# Patient Record
Sex: MALE | Race: WHITE | NOT HISPANIC OR LATINO
[De-identification: names, ages, dates, MRNs, and addresses within clinical notes are randomized per-mention and may not be internally consistent; named-entity substitution may affect disease eponyms.]

---

## 2020-11-12 ENCOUNTER — TRANSCRIPTION ENCOUNTER (OUTPATIENT)
Age: 70
End: 2020-11-12

## 2020-11-19 ENCOUNTER — APPOINTMENT (OUTPATIENT)
Dept: PULMONOLOGY | Facility: HOSPITAL | Age: 70
End: 2020-11-19
Payer: MEDICARE

## 2020-11-19 VITALS — BODY MASS INDEX: 33.18 KG/M2 | WEIGHT: 245 LBS | HEIGHT: 72 IN

## 2020-11-19 DIAGNOSIS — Z87.891 PERSONAL HISTORY OF NICOTINE DEPENDENCE: ICD-10-CM

## 2020-11-19 DIAGNOSIS — N40.0 BENIGN PROSTATIC HYPERPLASIA WITHOUT LOWER URINARY TRACT SYMPMS: ICD-10-CM

## 2020-11-19 DIAGNOSIS — F41.9 ANXIETY DISORDER, UNSPECIFIED: ICD-10-CM

## 2020-11-19 DIAGNOSIS — E78.5 HYPERLIPIDEMIA, UNSPECIFIED: ICD-10-CM

## 2020-11-19 PROBLEM — Z00.00 ENCOUNTER FOR PREVENTIVE HEALTH EXAMINATION: Status: ACTIVE | Noted: 2020-11-19

## 2020-11-19 PROCEDURE — 99213 OFFICE O/P EST LOW 20 MIN: CPT | Mod: 95

## 2020-11-19 RX ORDER — ROSUVASTATIN CALCIUM 5 MG/1
TABLET, FILM COATED ORAL
Refills: 0 | Status: ACTIVE | COMMUNITY

## 2020-11-19 RX ORDER — DUTASTERIDE 0.5 MG/1
CAPSULE, LIQUID FILLED ORAL
Refills: 0 | Status: ACTIVE | COMMUNITY

## 2020-11-19 NOTE — HISTORY OF PRESENT ILLNESS
[FreeTextEntry1] : pmd: Dr. Montoya\par \par History of present illness:\par 70 -year-old man with a history of  BPH, dyslipidemia, GERD is referred to the sleep Center for management of sleep apnea.  Patients symptoms improved with cpap - snoring improved, anxiety episodes improved.  Dme - apria\par uses fullface mask\par average usage 7hr and 50 min\par ahi 0.7\par average pressure 12 cm ( 10-14)

## 2020-11-19 NOTE — REASON FOR VISIT
[Home] : at home, [unfilled] , at the time of the visit. [Medical Office: ([facility])___] : at the medical office located in  [Verbal consent obtained from patient] : the patient, [unfilled] [Follow-Up] : a follow-up visit [FreeTextEntry1] : sleep apnea

## 2020-11-19 NOTE — ASSESSMENT
[FreeTextEntry1] : 70 year  old man  with sleep apnea is doing well with the CPAP.  Patient is compliant with the CPAP and benefited significantly with the CPAP.\par

## 2020-12-22 ENCOUNTER — APPOINTMENT (OUTPATIENT)
Dept: HEART AND VASCULAR | Facility: CLINIC | Age: 70
End: 2020-12-22

## 2021-11-16 ENCOUNTER — APPOINTMENT (OUTPATIENT)
Dept: PULMONOLOGY | Facility: CLINIC | Age: 71
End: 2021-11-16
Payer: MEDICARE

## 2021-11-16 VITALS — WEIGHT: 245 LBS | BODY MASS INDEX: 33.18 KG/M2 | HEIGHT: 72 IN

## 2021-11-16 PROCEDURE — 99212 OFFICE O/P EST SF 10 MIN: CPT | Mod: 95

## 2021-11-16 RX ORDER — FAMOTIDINE 20 MG/1
20 TABLET, FILM COATED ORAL
Refills: 0 | Status: DISCONTINUED | COMMUNITY
End: 2021-11-16

## 2021-11-16 RX ORDER — TAMSULOSIN HYDROCHLORIDE 0.4 MG/1
0.4 CAPSULE ORAL
Refills: 0 | Status: ACTIVE | COMMUNITY

## 2021-11-16 NOTE — HISTORY OF PRESENT ILLNESS
[FreeTextEntry1] : pmd: Dr. Montoya\par \par History of present illness:\par 71 -year-old man with a history of  BPH, dyslipidemia, GERD is referred to the sleep Center for management of sleep apnea.  Patients symptoms improved with cpap - snoring improved, anxiety episodes improved.  Dme - apria\par uses fullface mask\par average usage 7hr and 50 min\par ahi 0.4\par average pressure 12 cm ( 10-14)\par dme apria

## 2021-11-16 NOTE — ASSESSMENT
[FreeTextEntry1] : 71 year  old man  with sleep apnea is doing well with the CPAP.  Patient is compliant with the CPAP and benefited significantly with the CPAP.\par

## 2021-11-16 NOTE — REASON FOR VISIT
[Home] : at home, [unfilled] , at the time of the visit. [Medical Office: ([facility])___] : at the medical office located in  [Verbal consent obtained from patient] : the patient, [unfilled] [Follow-Up] : a follow-up visit [Sleep Apnea] : sleep apnea

## 2022-11-08 ENCOUNTER — OFFICE (OUTPATIENT)
Dept: URBAN - METROPOLITAN AREA CLINIC 122 | Facility: CLINIC | Age: 72
Setting detail: OPHTHALMOLOGY
End: 2022-11-08
Payer: MEDICARE

## 2022-11-08 DIAGNOSIS — H11.153: ICD-10-CM

## 2022-11-08 DIAGNOSIS — H02.831: ICD-10-CM

## 2022-11-08 DIAGNOSIS — H02.834: ICD-10-CM

## 2022-11-08 DIAGNOSIS — H16.223: ICD-10-CM

## 2022-11-08 DIAGNOSIS — H52.7: ICD-10-CM

## 2022-11-08 DIAGNOSIS — H25.13: ICD-10-CM

## 2022-11-08 DIAGNOSIS — H52.4: ICD-10-CM

## 2022-11-08 DIAGNOSIS — H43.393: ICD-10-CM

## 2022-11-08 PROCEDURE — 92015 DETERMINE REFRACTIVE STATE: CPT | Performed by: OPHTHALMOLOGY

## 2022-11-08 PROCEDURE — 92014 COMPRE OPH EXAM EST PT 1/>: CPT | Performed by: OPHTHALMOLOGY

## 2022-11-08 ASSESSMENT — REFRACTION_CURRENTRX
OD_AXIS: 35
OD_AXIS: 55
OS_SPHERE: +1.75
OS_SPHERE: +3.50
OS_SPHERE: +1.50
OD_CYLINDER: -2.00
OD_SPHERE: +2.25
OD_SPHERE: +4.75
OD_AXIS: 30
OD_OVR_VA: 20/
OS_SPHERE: +3.75
OD_CYLINDER: -2.00
OS_OVR_VA: 20/
OD_SPHERE: +2.00
OD_OVR_VA: 20/
OD_SPHERE: +4.25
OS_CYLINDER: SPHERE
OS_CYLINDER: SPHERE
OD_AXIS: 53
OD_SPHERE: +3.50
OS_CYLINDER: -1.00
OS_OVR_VA: 20/
OD_CYLINDER: -1.00
OD_CYLINDER: -1.25
OS_SPHERE: +3.25
OS_CYLINDER: -1.00
OD_SPHERE: +3.25
OS_OVR_VA: 20/
OS_AXIS: 139
OS_AXIS: 133
OD_CYLINDER: -1.25
OS_CYLINDER: -1.00
OS_AXIS: 137
OD_AXIS: 30
OS_AXIS: 180
OS_SPHERE: +1.00
OD_SPHERE: +2.50
OS_AXIS: 180
OD_AXIS: 25
OS_SPHERE: +3.25
OS_CYLINDER: -1.00
OD_CYLINDER: -1.00
OS_AXIS: 137
OD_OVR_VA: 20/

## 2022-11-08 ASSESSMENT — SPHEQUIV_DERIVED
OS_SPHEQUIV: 1
OD_SPHEQUIV: 1.875
OD_SPHEQUIV: 1.875
OD_SPHEQUIV: 1.5
OS_SPHEQUIV: 1.25
OD_SPHEQUIV: 1.5

## 2022-11-08 ASSESSMENT — REFRACTION_MANIFEST
OD_SPHERE: +2.00
OD_CYLINDER: -1.00
OS_CYLINDER: SPHERE
OD_SPHERE: +2.50
OS_ADD: +2.50
OD_VA2: 20
OD_ADD: +2.25
OD_SPHERE: +2.50
OS_SPHERE: +1.25
OS_CYLINDER: SPH
OS_SPHERE: +3.50
OS_SPHERE: +1.50
OD_CYLINDER: -2.00
OD_AXIS: 30
OD_ADD: +2.50
OS_ADD: +2.25
OS_AXIS: 140
OD_VA1: 20/25
OS_VA1: 20/20
OD_CYLINDER: SPHERE
OD_AXIS: 30
OD_SPHERE: +4.50
OD_CYLINDER: -1.25
OS_VA1: 20/25
OS_VA1: 20/20
OS_CYLINDER: -1.00
OS_CYLINDER: SPHERE
OS_SPHERE: +1.50
OD_VA1: 20/25
OD_AXIS: 40
OD_VA1: 20/20

## 2022-11-08 ASSESSMENT — KERATOMETRY
OD_K1POWER_DIOPTERS: 44.75
OS_K2POWER_DIOPTERS: 45.50
OS_K1POWER_DIOPTERS: 45.00
OD_K2POWER_DIOPTERS: 45.50
OS_AXISANGLE_DEGREES: 66
OD_AXISANGLE_DEGREES: 150
METHOD_AUTO_MANUAL: AUTO

## 2022-11-08 ASSESSMENT — LID POSITION - DERMATOCHALASIS
OS_DERMATOCHALASIS: LUL 2+
OD_DERMATOCHALASIS: RUL 2+

## 2022-11-08 ASSESSMENT — AXIALLENGTH_DERIVED
OD_AL: 22.4669
OD_AL: 22.3352
OD_AL: 22.3352
OS_AL: 22.5137
OD_AL: 22.4669
OS_AL: 22.6028

## 2022-11-08 ASSESSMENT — VISUAL ACUITY
OD_BCVA: 20/25
OS_BCVA: 20/30

## 2022-11-08 ASSESSMENT — REFRACTION_AUTOREFRACTION
OD_CYLINDER: -0.75
OS_SPHERE: +1.75
OD_SPHERE: +2.25
OD_AXIS: 47
OS_AXIS: 142
OS_CYLINDER: -1.00

## 2022-11-08 ASSESSMENT — CONFRONTATIONAL VISUAL FIELD TEST (CVF)
OD_FINDINGS: FULL
OS_FINDINGS: FULL

## 2022-11-08 ASSESSMENT — TONOMETRY
OS_IOP_MMHG: 12
OD_IOP_MMHG: 12

## 2022-11-08 ASSESSMENT — SUPERFICIAL PUNCTATE KERATITIS (SPK)
OS_SPK: 2+
OD_SPK: 2+

## 2022-12-28 ENCOUNTER — APPOINTMENT (OUTPATIENT)
Dept: PULMONOLOGY | Facility: CLINIC | Age: 72
End: 2022-12-28
Payer: MEDICARE

## 2022-12-28 VITALS — BODY MASS INDEX: 32.51 KG/M2 | WEIGHT: 240 LBS | HEIGHT: 72 IN

## 2022-12-28 PROCEDURE — 99213 OFFICE O/P EST LOW 20 MIN: CPT | Mod: 95

## 2022-12-28 NOTE — REASON FOR VISIT
[Follow-Up] : a follow-up visit [Sleep Apnea] : sleep apnea [Home] : at home, [unfilled] , at the time of the visit. [Medical Office: ([facility])___] : at the medical office located in  [Verbal consent obtained from patient] : the patient, [unfilled]

## 2022-12-28 NOTE — HISTORY OF PRESENT ILLNESS
[FreeTextEntry1] : pmd: Dr. Montoya\par \par History of present illness:\par 72 -year-old man with a history of  BPH, dyslipidemia, GERD, covid (mild disease 2022) is referred to the sleep Center for management of sleep apnea.  Patients symptoms improved with cpap - snoring improved, anxiety episodes improved.  Dme - apria\par uses fullface mask\par average usage 7hr and 20 min\par ahi 0.4\par average pressure 12 cm ( 10-14)\par on resmed machine

## 2023-01-12 ENCOUNTER — TRANSCRIPTION ENCOUNTER (OUTPATIENT)
Age: 73
End: 2023-01-12

## 2023-11-14 ENCOUNTER — OFFICE (OUTPATIENT)
Dept: URBAN - METROPOLITAN AREA CLINIC 122 | Facility: CLINIC | Age: 73
Setting detail: OPHTHALMOLOGY
End: 2023-11-14
Payer: MEDICARE

## 2023-11-14 DIAGNOSIS — H16.223: ICD-10-CM

## 2023-11-14 DIAGNOSIS — H25.13: ICD-10-CM

## 2023-11-14 DIAGNOSIS — H02.834: ICD-10-CM

## 2023-11-14 DIAGNOSIS — H43.393: ICD-10-CM

## 2023-11-14 DIAGNOSIS — H02.831: ICD-10-CM

## 2023-11-14 PROBLEM — D18.01 HEMANGIOMA: Status: ACTIVE | Noted: 2023-11-14

## 2023-11-14 PROCEDURE — 92014 COMPRE OPH EXAM EST PT 1/>: CPT | Mod: 25 | Performed by: OPHTHALMOLOGY

## 2023-11-14 PROCEDURE — 68761 CLOSE TEAR DUCT OPENING: CPT | Mod: 50 | Performed by: OPHTHALMOLOGY

## 2023-11-14 ASSESSMENT — REFRACTION_MANIFEST
OS_ADD: +2.50
OD_VA1: 20/25
OD_AXIS: 30
OD_SPHERE: +2.50
OS_SPHERE: +1.50
OD_SPHERE: +2.50
OS_CYLINDER: SPH
OD_VA2: 20
OS_CYLINDER: SPHERE
OS_VA1: 20/20
OD_VA1: 20/20
OS_SPHERE: +1.50
OD_VA1: 20/25
OD_ADD: +2.25
OS_ADD: +2.25
OD_SPHERE: +2.00
OD_CYLINDER: -1.00
OD_SPHERE: +4.50
OS_CYLINDER: -1.00
OS_SPHERE: +1.25
OD_CYLINDER: SPHERE
OS_VA1: 20/25
OD_CYLINDER: -2.00
OS_VA1: 20/20
OD_ADD: +2.50
OD_AXIS: 30
OD_CYLINDER: -1.25
OS_AXIS: 140
OS_SPHERE: +3.50
OD_AXIS: 40
OS_CYLINDER: SPHERE

## 2023-11-14 ASSESSMENT — LID POSITION - DERMATOCHALASIS
OS_DERMATOCHALASIS: LUL 2+
OD_DERMATOCHALASIS: RUL 2+

## 2023-11-14 ASSESSMENT — REFRACTION_AUTOREFRACTION
OD_AXIS: 47
OS_CYLINDER: -1.00
OD_SPHERE: +2.25
OS_SPHERE: +1.75
OS_AXIS: 142
OD_CYLINDER: -0.75

## 2023-11-14 ASSESSMENT — SPHEQUIV_DERIVED
OD_SPHEQUIV: 1.5
OD_SPHEQUIV: 1.875
OD_SPHEQUIV: 1.5
OS_SPHEQUIV: 1
OS_SPHEQUIV: 1.25
OD_SPHEQUIV: 1.875

## 2023-11-14 ASSESSMENT — REFRACTION_CURRENTRX
OS_SPHERE: +1.75
OD_OVR_VA: 20/
OD_AXIS: 25
OD_AXIS: 33
OS_CYLINDER: -1.00
OS_CYLINDER: SPHERE
OS_CYLINDER: -1.00
OD_SPHERE: +2.25
OD_AXIS: 55
OD_SPHERE: +2.00
OD_CYLINDER: -2.00
OD_CYLINDER: -1.00
OS_SPHERE: +3.75
OD_SPHERE: +4.25
OD_SPHERE: +3.50
OD_AXIS: 30
OS_AXIS: 133
OS_CYLINDER: SPHERE
OS_AXIS: 139
OS_OVR_VA: 20/
OD_SPHERE: +2.00
OS_SPHERE: +1.00
OD_CYLINDER: -1.25
OS_SPHERE: +3.25
OS_SPHERE: +3.25
OD_CYLINDER: -2.00
OD_AXIS: 30
OD_OVR_VA: 20/
OD_OVR_VA: 20/
OS_CYLINDER: -1.00
OD_SPHERE: +3.25
OD_SPHERE: +2.50
OD_SPHERE: +4.75
OD_CYLINDER: -1.00
OS_OVR_VA: 20/
OS_AXIS: 137
OD_CYLINDER: -1.25
OS_SPHERE: +1.25
OS_AXIS: 137
OD_AXIS: 35
OS_CYLINDER: -1.00
OS_OVR_VA: 20/
OD_AXIS: 53
OS_SPHERE: +3.50
OS_AXIS: 180
OD_CYLINDER: -1.00
OS_SPHERE: +1.50
OS_AXIS: 180

## 2023-11-14 ASSESSMENT — CONFRONTATIONAL VISUAL FIELD TEST (CVF)
OS_FINDINGS: FULL
OD_FINDINGS: FULL

## 2023-11-14 ASSESSMENT — SUPERFICIAL PUNCTATE KERATITIS (SPK)
OD_SPK: 2+
OS_SPK: 2+

## 2024-01-12 ENCOUNTER — APPOINTMENT (OUTPATIENT)
Dept: PULMONOLOGY | Facility: CLINIC | Age: 74
End: 2024-01-12
Payer: MEDICARE

## 2024-01-12 VITALS — BODY MASS INDEX: 33.6 KG/M2 | HEIGHT: 71 IN | WEIGHT: 240 LBS

## 2024-01-12 DIAGNOSIS — G47.33 OBSTRUCTIVE SLEEP APNEA (ADULT) (PEDIATRIC): ICD-10-CM

## 2024-01-12 PROCEDURE — 99212 OFFICE O/P EST SF 10 MIN: CPT

## 2024-01-12 NOTE — HISTORY OF PRESENT ILLNESS
[% Days used: ____] : Days used: [unfilled] % [% Days used > 4 hrs: ____] : Days used > 4 hrs: [unfilled] % [Mean nightly usage: ___ hrs] : Mean nightly usage: [unfilled] hrs [Therapy based AHI: ___ /hr] : Therapy based AHI: [unfilled] / hr [FreeTextEntry1] : pmd: Dr. Montoya  History of present illness: 73 -year-old man with a history of  BPH, dyslipidemia, GERD, covid (mild disease 2022) is referred to the sleep Center for management of sleep apnea.  Patients symptoms improved with cpap - snoring improved, anxiety episodes improved.  Dme - apria uses fullface mask average usage 7hr and 20 min ahi 0.5 average pressure 12 cm ( 10-14) on resmed machine

## 2024-01-12 NOTE — REASON FOR VISIT
[Follow-Up] : a follow-up visit [Sleep Apnea] : sleep apnea [FreeTextEntry2] : sleep apnea [TextEntry] :  This visit is done virtually as per patients request. Patient understood limitations of tele visit and agrees to move forward. patients location - [place], [place] doctors location - [place], [place] identification verified with patients name and date of birth.

## 2024-01-12 NOTE — ASSESSMENT
[FreeTextEntry1] : 73 year  old man  with sleep apnea is doing well with the CPAP.  Patient is compliant with the CPAP and benefited significantly with the CPAP.

## 2024-04-23 ENCOUNTER — APPOINTMENT (OUTPATIENT)
Dept: SURGERY | Facility: CLINIC | Age: 74
End: 2024-04-23
Payer: MEDICARE

## 2024-04-23 VITALS — TEMPERATURE: 98.3 F | SYSTOLIC BLOOD PRESSURE: 136 MMHG | HEART RATE: 67 BPM | DIASTOLIC BLOOD PRESSURE: 77 MMHG

## 2024-04-23 DIAGNOSIS — Z87.39 PERSONAL HISTORY OF OTHER DISEASES OF THE MUSCULOSKELETAL SYSTEM AND CONNECTIVE TISSUE: ICD-10-CM

## 2024-04-23 DIAGNOSIS — K42.9 UMBILICAL HERNIA W/OUT OBSTRUCTION OR GANGRENE: ICD-10-CM

## 2024-04-23 DIAGNOSIS — Z87.438 PERSONAL HISTORY OF OTHER DISEASES OF MALE GENITAL ORGANS: ICD-10-CM

## 2024-04-23 DIAGNOSIS — Z86.39 PERSONAL HISTORY OF OTHER ENDOCRINE, NUTRITIONAL AND METABOLIC DISEASE: ICD-10-CM

## 2024-04-23 PROCEDURE — 99203 OFFICE O/P NEW LOW 30 MIN: CPT

## 2024-04-23 RX ORDER — FAMOTIDINE 10 MG/1
TABLET, FILM COATED ORAL
Refills: 0 | Status: ACTIVE | COMMUNITY

## 2024-04-23 RX ORDER — LANSOPRAZOLE 30 MG/1
TABLET, ORALLY DISINTEGRATING ORAL
Refills: 0 | Status: DISCONTINUED | COMMUNITY
End: 2024-04-23

## 2024-04-24 PROBLEM — K42.9 UMBILICAL HERNIA WITHOUT OBSTRUCTION AND WITHOUT GANGRENE: Status: ACTIVE | Noted: 2024-04-24

## 2024-04-24 NOTE — CONSULT LETTER
[Dear  ___] : Dear  [unfilled], [Consult Letter:] : I had the pleasure of evaluating your patient, [unfilled]. [Please see my note below.] : Please see my note below. [Consult Closing:] : Thank you very much for allowing me to participate in the care of this patient.  If you have any questions, please do not hesitate to contact me. [Sincerely,] : Sincerely, [FreeTextEntry3] : Lynette Sherman MD FACS Director, [facility] Division of General and Acute Care Surgery Director, Surgical Quality for [facility] Interim Director [facility]   Office phone: [phone] Cell phone:  [phone] email:  [email or web address]

## 2024-04-24 NOTE — HISTORY OF PRESENT ILLNESS
[de-identified] : 73 yr old male, patient of Dr. Montoya's, referred for evaluation of chronic umbilical hernia.  He is not having any symptoms but does note it is "pushed out" all the time.  He states he and his wife do a lof of traveling and he doesn't want to have problems when he is out of town.

## 2024-04-24 NOTE — PHYSICAL EXAM
[Respiratory Effort] : normal respiratory effort [No Rash or Lesion] : No rash or lesion [Alert] : alert [Oriented to Person] : oriented to person [Oriented to Place] : oriented to place [Calm] : calm [de-identified] : NAD [de-identified] : nml [de-identified] : soft, nontender, 1 cm umbilical hernia -reducible but re-incarcerates (fat) right away

## 2024-04-24 NOTE — ASSESSMENT
[FreeTextEntry1] : Umbilical hernia - currently asymptomatic.  We discussed the course of possible enlargement of the hernia as well as surgical options when it is small.  I recommended primary suture repair at the size it is now, but if it enlarges it will require mesh.

## 2024-05-14 ENCOUNTER — APPOINTMENT (OUTPATIENT)
Dept: ORTHOPEDIC SURGERY | Facility: CLINIC | Age: 74
End: 2024-05-14
Payer: MEDICARE

## 2024-05-14 DIAGNOSIS — M48.061 SPINAL STENOSIS, LUMBAR REGION WITHOUT NEUROGENIC CLAUDICATION: ICD-10-CM

## 2024-05-14 PROCEDURE — 99202 OFFICE O/P NEW SF 15 MIN: CPT

## 2024-05-15 NOTE — ADDENDUM
[FreeTextEntry1] :  Documented by Lucero Lynch acting as a scribe under Dr. Tramaine Sweeney. 05/14/2024

## 2024-05-15 NOTE — HISTORY OF PRESENT ILLNESS
[de-identified] : Andrzej Larson is friends with Andrzej Couch. 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 Patient has had ongoing midline back pain for the past couple of years, with occasional radiation down the LLE. He notes no inciting incident. Has treated the pain with epidural injections in the past, the last of which was 1.5 weeks ago (at the end of April). Has tried physiotherapy in the past with minimal affect on pain. He has not been taking any medications for the pain, but states that he will occasionally take aleve when the epidural begins to wear off. No h/o back surgery. No numbness or tingling in the legs.  Notes that he has had some difficulty walking up steps, due to pain and his knee buckling. He does not have leg pain. He has bilateral various knee deformities with severe arthrosis and is planning on left hole knee replacement in August. The right will follow shortly thereafter. In the past he has garnered significant symptomatic relief for up to 3 to 4 months with epidural steroid injection.

## 2024-05-15 NOTE — END OF VISIT
[FreeTextEntry3] : I Alecia Andrea, acting as scribe, attest that this documentation has been prepared under the direction and in the presence of Provider Tramaine Sweeney MD.   I was physically present during the service to the patient and/or personally examined the patient and I was directly involved in the management plan and recommendations of the care provided to the patient.   I Dr. Sweeney, reviewed the history, the physical exam, and plan as documented by the PA. The documentation recorded by the PA, in my presence, accurately reflects the service I personally performed, and the decisions made by me with my edits as appropriate.  Tramaine Sweeney MD   Documented by Lucero Lynch acting as a scribe for Dr. Tramaine Sweeney. 05/14/2024   All medical record entries made by the Scribe were at my, Dr. Tramaine Sweeney. direction and personally dictated by me on 05/14/2024. I have reviewed the chart and agree that the record accurately reflects my personal performance of the history, physical exam, assessment and plan. I have also personally directed, reviewed, and agreed with the chart.

## 2024-05-15 NOTE — PHYSICAL EXAM
[Normal] : Alert and in no acute distress [de-identified] : Lumbar Spine:   Gait: normal     Palpation: -ttp throughout    Motor Strength:     Right: 5/5 throughout     Left: 5/5 throughout   Sensation:     Right: SILT throughout     Left: SILT throughout [de-identified] : He has x-rays which show degenerative scoliosis, MRI that shows later recess and multilevel foraminal stenosis.

## 2024-05-15 NOTE — PLAN
[TextEntry] : I have discouraged him from surgical intervention, encouraged him to continue with weight loss, core strengthening, cycling, swimming and epidural steroid injection. I have discouraged him from a large reconstructive operation. As the magnitude is prohibitive and the success rate of the operation, I estimate at approximately 50% with a 100% risk of complication as well as need for future surgery for adjacent segment stenosis and degeneration. He verbalized understanding. He will follow up with us 6 months to a year following his knee replacements. He will continue with conservative nonsurgical treatment.

## 2024-09-19 ENCOUNTER — APPOINTMENT (OUTPATIENT)
Dept: PULMONOLOGY | Facility: CLINIC | Age: 74
End: 2024-09-19
Payer: MEDICARE

## 2024-09-19 VITALS — WEIGHT: 240 LBS | HEIGHT: 71 IN | BODY MASS INDEX: 33.6 KG/M2

## 2024-09-19 DIAGNOSIS — G47.33 OBSTRUCTIVE SLEEP APNEA (ADULT) (PEDIATRIC): ICD-10-CM

## 2024-09-19 PROCEDURE — 99212 OFFICE O/P EST SF 10 MIN: CPT

## 2024-09-19 NOTE — HISTORY OF PRESENT ILLNESS
[% Days used: ____] : Days used: [unfilled] % [% Days used > 4 hrs: ____] : Days used > 4 hrs: [unfilled] % [Mean nightly usage: ___ hrs] : Mean nightly usage: [unfilled] hrs [Therapy based AHI: ___ /hr] : Therapy based AHI: [unfilled] / hr [FreeTextEntry1] : pmd: Dr. Montoya  74 -year-old man with a history of  BPH, dyslipidemia, GERD, covid (mild disease 2022) is referred to the sleep Center for management of sleep apnea.  Patients symptoms improved with cpap - snoring improved, anxiety episodes improved.  [place] - marie uses fullface mask average usage 7hr and 40 min ahi 0.6 average pressure 12 cm ( 10-14) on resmed machine His machine is more than 5 yrs old, will need replacement.

## 2024-09-19 NOTE — REASON FOR VISIT
[Follow-Up] : a follow-up visit [Sleep Apnea] : sleep apnea [TextEntry] :  This visit is done virtually using both audio and video as per patients request. Patient understood limitations of tele visit and agrees to move forward. [facility] [place] location - [place], [place] identification verified with patients name and date of birth.

## 2024-09-19 NOTE — ASSESSMENT
[FreeTextEntry1] : The patient demonstrates significant improvement in symptoms with CPAP therapy. The current AHI indicates effective control of sleep apnea, and the average usage is within the recommended range. However, the need for a replacement device is noted due to the age of the equipment. Patient is compliant and benefitted with cpap. Plan: CPAP Machine Replacement: Initiate the process for replacing the ResMed machine through [employer]. Ordered autopap 10-14 cm Follow-Up: Schedule a follow-up appointment to assess the new machine's effectiveness and any further adjustments needed.

## 2024-11-15 ENCOUNTER — OFFICE (OUTPATIENT)
Dept: URBAN - METROPOLITAN AREA CLINIC 122 | Facility: CLINIC | Age: 74
Setting detail: OPHTHALMOLOGY
End: 2024-11-15
Payer: MEDICARE

## 2024-11-15 DIAGNOSIS — H11.153: ICD-10-CM

## 2024-11-15 DIAGNOSIS — H43.393: ICD-10-CM

## 2024-11-15 DIAGNOSIS — D18.01: ICD-10-CM

## 2024-11-15 DIAGNOSIS — H16.223: ICD-10-CM

## 2024-11-15 DIAGNOSIS — H02.831: ICD-10-CM

## 2024-11-15 DIAGNOSIS — H02.834: ICD-10-CM

## 2024-11-15 DIAGNOSIS — H25.13: ICD-10-CM

## 2024-11-15 PROCEDURE — 92014 COMPRE OPH EXAM EST PT 1/>: CPT | Performed by: OPHTHALMOLOGY

## 2024-11-15 ASSESSMENT — CONFRONTATIONAL VISUAL FIELD TEST (CVF)
OS_FINDINGS: FULL
OD_FINDINGS: FULL

## 2024-11-15 ASSESSMENT — SUPERFICIAL PUNCTATE KERATITIS (SPK)
OS_SPK: 2+
OD_SPK: 2+

## 2024-11-15 ASSESSMENT — LID POSITION - DERMATOCHALASIS
OD_DERMATOCHALASIS: RUL 2+
OS_DERMATOCHALASIS: LUL 2+

## 2024-11-15 ASSESSMENT — TONOMETRY
OD_IOP_MMHG: 11
OS_IOP_MMHG: 12

## 2024-11-19 ASSESSMENT — REFRACTION_CURRENTRX
OD_AXIS: 55
OD_SPHERE: +4.75
OD_OVR_VA: 20/
OS_AXIS: 139
OD_SPHERE: +3.50
OD_CYLINDER: -1.00
OD_CYLINDER: -2.00
OD_CYLINDER: SPH
OS_SPHERE: +3.50
OD_CYLINDER: -1.25
OD_AXIS: 30
OS_SPHERE: +1.25
OD_CYLINDER: -1.00
OD_AXIS: 35
OD_VPRISM_DIRECTION: SV
OD_AXIS: 30
OS_VPRISM_DIRECTION: SV
OS_AXIS: 180
OS_SPHERE: +1.75
OS_CYLINDER: SPH
OD_SPHERE: +2.00
OD_CYLINDER: -1.00
OS_CYLINDER: SPH
OS_CYLINDER: -1.00
OD_OVR_VA: 20/
OS_OVR_VA: 20/
OS_CYLINDER: -1.00
OS_VPRISM_DIRECTION: SV
OD_SPHERE: +2.00
OD_AXIS: 33
OD_CYLINDER: -1.00
OS_SPHERE: +1.25
OD_SPHERE: +2.00
OD_SPHERE: +2.25
OS_OVR_VA: 20/
OS_SPHERE: +3.75
OS_SPHERE: +3.25
OD_VPRISM_DIRECTION: SV
OS_SPHERE: +3.50
OD_SPHERE: +4.25
OS_SPHERE: +1.25
OD_AXIS: 30
OD_OVR_VA: 20/
OS_AXIS: 133
OD_SPHERE: +3.50
OS_CYLINDER: SPHERE
OD_AXIS: 53
OS_OVR_VA: 20/
OD_CYLINDER: -1.00
OS_CYLINDER: -1.00
OS_AXIS: 137

## 2024-11-19 ASSESSMENT — REFRACTION_MANIFEST
OS_ADD: +2.50
OD_VA1: 20/20
OS_VA1: 20/30
OD_VA2: 20
OD_VA1: 20/25
OS_VA1: 20/20
OS_CYLINDER: SPH
OS_CYLINDER: -1.00
OD_VA1: 20/25
OS_VA1: 20/25
OD_AXIS: 30
OD_SPHERE: +2.50
OD_ADD: +2.25
OS_CYLINDER: SPHERE
OD_SPHERE: +2.00
OD_CYLINDER: -1.25
OS_AXIS: 140
OD_SPHERE: +4.50
OD_CYLINDER: SPHERE
OS_SPHERE: +1.50
OD_AXIS: 40
OS_CYLINDER: SPHERE
OD_AXIS: 30
OD_SPHERE: +2.50
OD_ADD: +2.50
OD_CYLINDER: -1.00
OD_CYLINDER: -2.00
OS_ADD: +2.25
OS_SPHERE: +3.50
OS_SPHERE: +1.50
OS_SPHERE: +1.25

## 2024-11-19 ASSESSMENT — KERATOMETRY
METHOD_AUTO_MANUAL: AUTO
OS_K2POWER_DIOPTERS: 45.50
OD_K1POWER_DIOPTERS: 44.75
OS_K1POWER_DIOPTERS: 45.00
OD_K2POWER_DIOPTERS: 45.50
OS_AXISANGLE_DEGREES: 66
OD_AXISANGLE_DEGREES: 150

## 2024-11-19 ASSESSMENT — VISUAL ACUITY
OS_BCVA: 20/25
OD_BCVA: 20/30

## 2024-11-19 ASSESSMENT — REFRACTION_AUTOREFRACTION
OD_AXIS: 59
OS_AXIS: 136
OS_CYLINDER: -1.25
OD_SPHERE: +2.25
OD_CYLINDER: -1.00
OS_SPHERE: +1.75

## 2024-12-19 ENCOUNTER — APPOINTMENT (OUTPATIENT)
Dept: PULMONOLOGY | Facility: CLINIC | Age: 74
End: 2024-12-19
Payer: MEDICARE

## 2024-12-19 VITALS — WEIGHT: 240 LBS | HEIGHT: 71 IN | BODY MASS INDEX: 33.6 KG/M2

## 2024-12-19 DIAGNOSIS — G47.33 OBSTRUCTIVE SLEEP APNEA (ADULT) (PEDIATRIC): ICD-10-CM

## 2024-12-19 PROCEDURE — 99213 OFFICE O/P EST LOW 20 MIN: CPT
